# Patient Record
Sex: FEMALE | Race: WHITE | NOT HISPANIC OR LATINO | Employment: FULL TIME | URBAN - METROPOLITAN AREA
[De-identification: names, ages, dates, MRNs, and addresses within clinical notes are randomized per-mention and may not be internally consistent; named-entity substitution may affect disease eponyms.]

---

## 2018-01-15 NOTE — PROGRESS NOTES
Assessment   1  Encounter for preventive health examination (V70 0) (Z00 00)    Plan  Health Maintenance    · Start: Omeprazole 20 MG Oral Capsule Delayed Release; take 1 capsule daily    Discussion/Summary  Advice and education were given regarding aerobic exercise  Chief Complaint  MED MANAGE      History of Present Illness  HM, Adult Female: The patient is being seen for a health maintenance evaluation  General Health: The patient's health since the last visit is described as good  She has regular dental visits  Immunizations status: up to date  Lifestyle:  She consumes a diverse and healthy diet  She does not have any weight concerns  She exercises regularly  She does not use tobacco    Reproductive health:  she reports normal menses  Screening: cancer screening reviewed and current  metabolic screening reviewed and current  risk screening reviewed and current  Review of Systems    Constitutional: No fever, no chills, feels well, no tiredness, no recent weight gain or weight loss  Cardiovascular: No complaints of slow heart rate, no fast heart rate, no chest pain, no palpitations, no leg claudication, no lower extremity edema  Respiratory: No complaints of shortness of breath, no wheezing, no cough, no SOB on exertion, no orthopnea, no PND  Gastrointestinal: No complaints of abdominal pain, no constipation, no nausea or vomiting, no diarrhea, no bloody stools  Active Problems   1  Amenorrhea (626 0) (N91 2)  2  Diphtheria-tetanus-pertussis (DTP) vaccination (V06 1) (Z23)  3  Migraine with aura and without status migrainosus, not intractable (346 00) (G43 109)  4  Neck pain (723 1) (M54 2)  5  History of Oral contraceptive use (V25 41) (Z30 41)  6   Well adult on routine health check (V70 0) (Z00 00)    Past Medical History    · History of Acute mucoid otitis media of right ear (381 02) (H65 111)   · History of Bacterial vaginosis (616 10,041 9) (N76 0,B96 89)   · History of Cyst of ovary, right (620 2) (N83 201)   · History of Dysuria (788 1) (R30 0)   · History of Encounter for IUD removal (V25 12) (Z30 432)   · History of abdominal pain (V13 89) (M97 013)   · History of folliculitis (W11 1) (N19 5)   · History of migraine (V12 49) (Z86 69)   · History of Pelvic and perineal pain (625 9) (R10 2)   · History of Shoulder injury (959 2) (S49 90XA)   · History of Summary Of Previous Pregnancies  1  (Total No )   · History of Summary Of Previous Pregnancies Para 1  (Deliveries)   · History of Vaginal candidiasis (112 1) (B37 3)    Surgical History    · History of Denial Of Any Significant Medical History    Family History  Mother    · Family history of Hysteroscopy  Father    · Family history of Hypercholesteremia   · Family history of Hypertension (V17 49)  Maternal Relatives    · Family history of Ovarian ca    Social History    · Denied: History of Alcohol Use (History)   · Caffeine Use   · History of Current Every Day Smoker (305 1)   · Denied: History of Drug Use   · Former smoker (V15 82) (P09 115)   · Marital History - Single   · History of Oral contraceptive use (V25 41) (Z30 41)   · Denominational Affiliation [employer]   · Uses Safety Equipment - Seatbelts    Current Meds  1  SUMAtriptan Succinate 100 MG Oral Tablet; TAKE 1 TABLET AT ONSET OF MIGRAINE   HEADACHE  MAY REPEAT IN 2 HOURS IF NEEDED; Therapy: [id number] to (Last Rx:[id number])  Requested for: 12RPR4986 Ordered    Allergies   1  Ceftin TABS  2  Sulfa Drugs   3  Latex    Vitals   Recorded: 17RDK3771 02:65LN   Systolic 366   Diastolic 78   Heart Rate 78   O2 Saturation 98   Height 5 ft 3 in   Weight 155 lb    BMI Calculated 27 46   BSA Calculated 1 74     Physical Exam    Constitutional   General appearance: No acute distress, well appearing and well nourished  Ears, Nose, Mouth, and Throat   Nasal mucosa, septum, and turbinates: Normal without edema or erythema  Lips, teeth, and gums: Normal, good dentition  Oropharynx: Normal with no erythema, edema, exudate or lesions  Neck   Neck: Supple, symmetric, trachea midline, no masses  Thyroid: Normal, no thyromegaly  Pulmonary   Auscultation of lungs: Clear to auscultation  Cardiovascular   Auscultation of heart: Normal rate and rhythm, normal S1 and S2, no murmurs  Abdomen   Abdomen: Non-tender, no masses  Health Management  Diphtheria-tetanus-pertussis (DTP) vaccination   Tdap (Boostrix); every 10 years; Deferred until [id number]: Pt Refuses;  Temporary  Deferral    Signatures   Electronically signed by : Dav Lopez MD; Oct 13 2016  9:19AM EST                       (Author)

## 2018-03-28 ENCOUNTER — OFFICE VISIT (OUTPATIENT)
Dept: FAMILY MEDICINE CLINIC | Facility: CLINIC | Age: 28
End: 2018-03-28
Payer: COMMERCIAL

## 2018-03-28 VITALS
HEIGHT: 63 IN | SYSTOLIC BLOOD PRESSURE: 120 MMHG | HEART RATE: 84 BPM | WEIGHT: 162 LBS | OXYGEN SATURATION: 96 % | BODY MASS INDEX: 28.7 KG/M2 | RESPIRATION RATE: 16 BRPM | DIASTOLIC BLOOD PRESSURE: 70 MMHG

## 2018-03-28 DIAGNOSIS — K21.9 GASTROESOPHAGEAL REFLUX DISEASE WITHOUT ESOPHAGITIS: Primary | ICD-10-CM

## 2018-03-28 DIAGNOSIS — F41.9 ANXIETY: ICD-10-CM

## 2018-03-28 DIAGNOSIS — S29.019A THORACIC MYOFASCIAL STRAIN, INITIAL ENCOUNTER: ICD-10-CM

## 2018-03-28 PROCEDURE — 99214 OFFICE O/P EST MOD 30 MIN: CPT | Performed by: FAMILY MEDICINE

## 2018-03-28 RX ORDER — FAMOTIDINE 40 MG/1
40 TABLET, FILM COATED ORAL DAILY
Qty: 30 TABLET | Refills: 2 | Status: SHIPPED | OUTPATIENT
Start: 2018-03-28 | End: 2019-09-24 | Stop reason: SDUPTHER

## 2018-03-28 RX ORDER — SUMATRIPTAN 100 MG/1
1 TABLET, FILM COATED ORAL
COMMUNITY
Start: 2016-05-19 | End: 2019-11-14 | Stop reason: SDUPTHER

## 2018-03-28 NOTE — PROGRESS NOTES
Assessment/Plan:    No problem-specific Assessment & Plan notes found for this encounter  Diagnoses and all orders for this visit:    Gastroesophageal reflux disease without esophagitis    Thoracic myofascial strain, initial encounter    Anxiety        Patient Instructions   1) Start famotidine daily in AM at 40mg-  Use antacids after meals if having stomach upset  2) Tylenol 500 mg 2 tablets every 8 hours  For the shoulder pain as needed  Also some heat and of medium heat tend 50 minutes every 4 hours  Gentle stretching  Sleep with   Neck in the neutral position  3) as far as anxiety stay away from the caffeine  Avoid spicy foods also  4) _ nasal saline and throat lozenges for URI congestion          Subjective:   Chief Complaint   Patient presents with    Pain     epigastric pain, left leg and arm numbness          Patient ID: Steve Gottlieb is a 33 y o  female  Patient comes in today with 2 problems -  1) patient having left-sided posterior shoulder pain associated at times with some neck pain and numbness and tingling down the arms  No aggravating or alleviating activities are recognized  This tends to cause her anxiety and she gets more short of breath and heart palpitations  Presently happens 2 to 3 times a week  No injuries recently  She did have a fall 2 years ago  2 ) epigastric distress with some belching and substernal chest pain  This makes her feel short of breath  She has not taken any over-the-counter medications on a regular basis  3) several days of sinus pressure with thick discharge  And some facial pressure  The following portions of the patient's history were reviewed and updated as appropriate: allergies, current medications, past family history, past medical history, past social history and problem list     Review of Systems   Constitutional: Negative for chills, fatigue and fever  HENT: Positive for rhinorrhea and sinus pressure   Negative for sore throat  Gastrointestinal: Positive for nausea  Negative for abdominal pain, constipation, diarrhea and vomiting  Psychiatric/Behavioral: The patient is nervous/anxious  Objective:      /70 (BP Location: Right arm, Patient Position: Sitting, Cuff Size: Standard)   Pulse 84   Resp 16   Ht 5' 3" (1 6 m)   Wt 73 5 kg (162 lb)   SpO2 96%   BMI 28 70 kg/m²          Physical Exam   Constitutional: She appears well-developed and well-nourished  No distress  HENT:   Head: Normocephalic and atraumatic  Right Ear: Tympanic membrane and external ear normal  No drainage  Left Ear: Tympanic membrane normal  There is drainage  Nose: Mucosal edema and rhinorrhea present  Right sinus exhibits no maxillary sinus tenderness  Left sinus exhibits maxillary sinus tenderness  Mouth/Throat: Posterior oropharyngeal erythema present  No oropharyngeal exudate  Eyes: Conjunctivae and EOM are normal  Right eye exhibits no discharge  Left eye exhibits no discharge  Neck: Normal range of motion  Neck supple  No thyromegaly present  Cardiovascular: Normal rate, regular rhythm and normal heart sounds  Pulmonary/Chest: Effort normal  No respiratory distress  She has no wheezes  She has no rales  Abdominal: Soft  There is no hepatosplenomegaly or splenomegaly  There is tenderness in the epigastric area  There is no CVA tenderness  Lymphadenopathy:     She has no cervical adenopathy  Skin: Skin is warm and dry

## 2018-03-28 NOTE — PATIENT INSTRUCTIONS
1) Start famotidine daily in AM at 40mg-  Use antacids after meals if having stomach upset  2) Tylenol 500 mg 2 tablets every 8 hours  For the shoulder pain as needed  Also some heat and of medium heat tend 50 minutes every 4 hours  Gentle stretching  Sleep with   Neck in the neutral position  3) as far as anxiety stay away from the caffeine  Avoid spicy foods also    4) _ nasal saline and throat lozenges for URI congestion

## 2018-04-03 ENCOUNTER — TELEPHONE (OUTPATIENT)
Dept: OBGYN CLINIC | Facility: CLINIC | Age: 28
End: 2018-04-03

## 2018-04-05 ENCOUNTER — OFFICE VISIT (OUTPATIENT)
Dept: OBGYN CLINIC | Facility: CLINIC | Age: 28
End: 2018-04-05
Payer: COMMERCIAL

## 2018-04-05 VITALS
SYSTOLIC BLOOD PRESSURE: 118 MMHG | WEIGHT: 162.6 LBS | HEIGHT: 64 IN | DIASTOLIC BLOOD PRESSURE: 70 MMHG | BODY MASS INDEX: 27.76 KG/M2

## 2018-04-05 DIAGNOSIS — Z97.5 IUD CONTRACEPTION: ICD-10-CM

## 2018-04-05 DIAGNOSIS — R10.2 PELVIC CRAMPING: ICD-10-CM

## 2018-04-05 DIAGNOSIS — N91.1 SECONDARY AMENORRHEA: ICD-10-CM

## 2018-04-05 DIAGNOSIS — B96.89 BACTERIAL VAGINOSIS: Primary | ICD-10-CM

## 2018-04-05 DIAGNOSIS — N76.0 BACTERIAL VAGINOSIS: Primary | ICD-10-CM

## 2018-04-05 DIAGNOSIS — N89.8 VAGINAL ODOR: ICD-10-CM

## 2018-04-05 LAB — SL AMB POCT WET MOUNT: 5.5

## 2018-04-05 PROCEDURE — 87210 SMEAR WET MOUNT SALINE/INK: CPT | Performed by: OBSTETRICS & GYNECOLOGY

## 2018-04-05 PROCEDURE — 99203 OFFICE O/P NEW LOW 30 MIN: CPT | Performed by: OBSTETRICS & GYNECOLOGY

## 2018-04-05 RX ORDER — METRONIDAZOLE 7.5 MG/G
1 GEL VAGINAL DAILY
Qty: 70 G | Refills: 0 | Status: SHIPPED | OUTPATIENT
Start: 2018-04-05 | End: 2018-06-21 | Stop reason: SDUPTHER

## 2018-04-05 NOTE — PROGRESS NOTES
Assessment/Plan:  1  Bacterial vaginosis-patient counseled about the findings  Treatment options were reviewed  Electronic prescription for Metrogel vaginal to take nightly for 5 nights was sent a local pharmacy  She was encouraged to avoid alcohol use during this time  She will follow up if persistent symptoms noted  2   IUD contraception-patient had Mirena IUD inserted from [id number], had it removed due to concerns about side effects from the device and then [facility]  She wanted to make sure that the IUD was in good position and it is on exam today with IUD string noted at about 3 cm  She will continue with this contraceptive device  3   Secondary amenorrhea-related to Mirena IUD  4   Vaginal odor-consistent with bacterial vaginosis  Patient with her partner for greater than 2 years and declined STD testing  5   Other-patient overdue for yearly exam   She will return in the near future for this testing  6   Pelvic cramping-noted intermittently  Could be related to bacterial vaginosis  Pelvic exam is entirely unremarkable for any adnexal pain or tenderness or masses  We will defer ultrasound at this time  Of note, patient did have ultrasound last in 2014 with negative findings  She will follow-up in the near future for yearly exam   No problem-specific Assessment & Plan notes found for this encounter  Diagnoses and all orders for this visit:    Bacterial vaginosis  -     metroNIDAZOLE (METROGEL) 0 75 % vaginal gel; Insert 1 application into the vagina daily At night  -     POCT wet mount    Secondary amenorrhea    IUD contraception    Vaginal odor  -     POCT wet mount          Subjective:      Patient ID: Leroy Baum is a 33 y o  female  Patient was seen today for follow-up visit  She was last seen 2014  Please see assessment plan for details          The following portions of the patient's history were reviewed and updated as appropriate: allergies, current medications, past family history, past medical history, past social history, past surgical history and problem list     Review of Systems   Constitutional: Negative for chills, diaphoresis, fatigue and fever  Respiratory: Negative for apnea, cough, chest tightness, shortness of breath and wheezing  Cardiovascular: Negative for chest pain, palpitations and leg swelling  Gastrointestinal: Negative for abdominal distention, abdominal pain, anal bleeding, constipation, diarrhea, nausea, rectal pain and vomiting  Genitourinary: Negative for difficulty urinating, dyspareunia, dysuria, frequency, hematuria, menstrual problem, pelvic pain, urgency, vaginal bleeding, vaginal discharge and vaginal pain  Musculoskeletal: Negative for arthralgias, back pain and myalgias  Skin: Negative for color change and rash  Neurological: Negative for dizziness, syncope, light-headedness, numbness and headaches  Hematological: Negative for adenopathy  Does not bruise/bleed easily  Psychiatric/Behavioral: Negative for dysphoric mood and sleep disturbance  The patient is not nervous/anxious  vaginal odor    Objective:      /70 (BP Location: Right arm, Patient Position: Sitting, Cuff Size: Adult)   Ht 5' 4" (1 626 m)   Wt 73 8 kg (162 lb 9 6 oz)   BMI 27 91 kg/m²          Physical Exam    Objective      /70 (BP Location: Right arm, Patient Position: Sitting, Cuff Size: Adult)   Ht 5' 4" (1 626 m)   Wt 73 8 kg (162 lb 9 6 oz)   BMI 27 91 kg/m²     General:   alert and oriented, in no acute distress, alert, appears stated age and cooperative   Neck:    Breast:    Heart:    Lungs:    Abdomen: soft, non-tender, without masses or organomegaly   Vulva: normal   Vagina: Small amount of white discharge, without erythema or lesions noted  Cervix: Small amount of white discharge, without lesions or cervicitis  No Cervical motion tenderness    IUD string seen at a length of 2 5-3 cm   Uterus: top normal size, anteverted, non-tender   Adnexa: no mass, fullness, tenderness   Rectum: negative

## 2018-04-05 NOTE — PATIENT INSTRUCTIONS
Follow-up 1 month yearly examBacterial Vaginosis   WHAT YOU NEED TO KNOW:   What is bacterial vaginosis? Bacterial vaginosis (BV) is an infection in the vagina  It may cause vaginitis, which is irritation and inflammation of the vagina  What causes bacterial vaginosis? The cause of BV is not known  With BV, there is an imbalance in bacteria normally found in the vagina  Your risk for BV increases if you are sexually active  Your risk for BV also increases if you douche or have an intrauterine device (IUD)  What are the signs and symptoms of bacterial vaginosis? Some women have no symptoms  You may have the following:  · White, gray, or yellow vaginal discharge    · Vaginal discharge that smells like fish    · Itching or burning around the outside of your vagina  How is bacterial vaginosis diagnosed? Your healthcare provider will examine you and ask if you have other health conditions  He may need to take a sample of fluid from your vagina  This will be tested for the bacteria that causes BV  How is bacterial vaginosis treated? Antibiotics are given to kill the bacteria that cause BV  They may be given as a pill or as a cream to put in your vagina  Take or use as directed  What are the risks of bacterial vaginosis? If untreated, BV may spread and lead to serious infections in your uterus and fallopian tubes  This can make it more difficult to get pregnant  BV increases your risk for other sexually transmitted infections (STIs), such as chlamydia, gonorrhea, or HIV  How can I prevent bacterial vaginosis? · Keep your vaginal area clean and dry:  Wear underwear and pantyhose with a cotton crotch  Wipe from front to back after you urinate or have a bowel movement  After bathing, rinse soap from your vaginal area to decrease your risk for irritation  · Do not use products that cause irritation:  Always use unscented tampons or sanitary pads   Do not use feminine sprays, powders, or scented tampons because they may cause irritation and increase your risk of BV  Detergents and fabric softeners may also cause irritation  · Do not douche: This can cause an imbalance in healthy vaginal bacteria  · Use latex condoms: This helps prevent another infection and keeps your partner from getting the infection  When should I contact my healthcare provider? · Your symptoms come back or do not improve with treatment  · You have vaginal bleeding that is not your monthly period  · You have questions or concerns about your condition or care  CARE AGREEMENT:   You have the right to help plan your care  Learn about your health condition and how it may be treated  Discuss treatment options with your caregivers to decide what care you want to receive  You always have the right to refuse treatment  The above information is an  only  It is not intended as medical advice for individual conditions or treatments  Talk to your doctor, nurse or pharmacist before following any medical regimen to see if it is safe and effective for you  © 2017 [employer] Information is for End User's use only and may not be sold, redistributed or otherwise used for commercial purposes  All illustrations and images included in CareNotes® are the copyrighted property of A D A M , Inc  or [employer]

## 2018-06-21 DIAGNOSIS — N76.0 BACTERIAL VAGINOSIS: ICD-10-CM

## 2018-06-21 DIAGNOSIS — B96.89 BACTERIAL VAGINOSIS: ICD-10-CM

## 2018-06-21 RX ORDER — METRONIDAZOLE 7.5 MG/G
1 GEL VAGINAL DAILY
Qty: 70 G | Refills: 0 | Status: SHIPPED | OUTPATIENT
Start: 2018-06-21 | End: 2019-11-14 | Stop reason: ALTCHOICE

## 2019-07-10 ENCOUNTER — OFFICE VISIT (OUTPATIENT)
Dept: FAMILY MEDICINE CLINIC | Facility: CLINIC | Age: 29
End: 2019-07-10
Payer: COMMERCIAL

## 2019-07-10 VITALS
BODY MASS INDEX: 29.95 KG/M2 | WEIGHT: 169 LBS | HEIGHT: 63 IN | DIASTOLIC BLOOD PRESSURE: 72 MMHG | HEART RATE: 81 BPM | OXYGEN SATURATION: 98 % | SYSTOLIC BLOOD PRESSURE: 118 MMHG

## 2019-07-10 DIAGNOSIS — L02.91 ABSCESS: Primary | ICD-10-CM

## 2019-07-10 DIAGNOSIS — L72.3 SEBACEOUS CYST: ICD-10-CM

## 2019-07-10 PROCEDURE — 3008F BODY MASS INDEX DOCD: CPT | Performed by: FAMILY MEDICINE

## 2019-07-10 PROCEDURE — 3725F SCREEN DEPRESSION PERFORMED: CPT | Performed by: FAMILY MEDICINE

## 2019-07-10 PROCEDURE — 99213 OFFICE O/P EST LOW 20 MIN: CPT | Performed by: FAMILY MEDICINE

## 2019-07-10 RX ORDER — AMOXICILLIN 500 MG/1
500 CAPSULE ORAL EVERY 8 HOURS SCHEDULED
Qty: 15 CAPSULE | Refills: 0 | Status: SHIPPED | OUTPATIENT
Start: 2019-07-10 | End: 2019-07-15

## 2019-07-10 NOTE — PROGRESS NOTES
[facility]        NAME: Matt Salgado is a 33 y o  female  : 1990    MRN: [id number]  DATE: July 10, 2019  TIME: 2:10 PM    Assessment and Plan   Abscess [L02 91]  1  Abscess  amoxicillin (AMOXIL) 500 mg capsule   2  Sebaceous cyst           Patient Instructions     Patient Instructions   Moist heat--f/u 48 hrs          Chief Complaint     Chief Complaint   Patient presents with    Cyst     right side--NECK    Annual Exam     HM         History of Present Illness       inflammed cyst R neck      Review of Systems   Review of Systems   Constitutional: Negative for appetite change, chills, diaphoresis and fever  HENT: Negative for ear pain, rhinorrhea, sinus pressure and sore throat  Eyes: Negative for discharge, redness and itching  Respiratory: Negative for cough, shortness of breath and wheezing  Cardiovascular: Negative for chest pain and palpitations  Gastrointestinal: Negative for abdominal pain, diarrhea, nausea and vomiting       R neck inflammed seb cyst    Current Medications       Current Outpatient Medications:     famotidine (PEPCID) 40 MG tablet, Take 1 tablet (40 mg total) by mouth daily, Disp: 30 tablet, Rfl: 2    amoxicillin (AMOXIL) 500 mg capsule, Take 1 capsule (500 mg total) by mouth every 8 (eight) hours for 5 days, Disp: 15 capsule, Rfl: 0    metroNIDAZOLE (METROGEL) 0 75 % vaginal gel, Insert 1 application into the vagina daily At night (Patient not taking: Reported on 7/10/2019), Disp: 70 g, Rfl: 0    SUMAtriptan (IMITREX) 100 mg tablet, Take 1 tablet by mouth, Disp: , Rfl:     Current Allergies     Allergies as of 07/10/2019 - Reviewed 07/10/2019   Allergen Reaction Noted    Cefuroxime Anaphylaxis 2012    Sulfa antibiotics Anaphylaxis 2012    Latex Rash 2012            The following portions of the patient's history were reviewed and updated as appropriate: allergies, current medications, past family history, past medical history, past social history, past surgical history and problem list      No past medical history on file  Past Surgical History:   Procedure Laterality Date    NO PAST SURGERIES         Family History   Problem Relation Age of Onset    Hypertension Father     Hyperlipidemia Father     No Known Problems Mother     Substance Abuse Neg Hx     Mental illness Neg Hx          Medications have been verified          Objective   /72   Pulse 81   Ht 5' 3" (1 6 m)   Wt 76 7 kg (169 lb)   SpO2 98%   BMI 29 94 kg/m²        Physical Exam     Physical Exam

## 2019-07-17 ENCOUNTER — PROCEDURE VISIT (OUTPATIENT)
Dept: FAMILY MEDICINE CLINIC | Facility: CLINIC | Age: 29
End: 2019-07-17

## 2019-07-17 VITALS
SYSTOLIC BLOOD PRESSURE: 122 MMHG | HEART RATE: 75 BPM | BODY MASS INDEX: 29.95 KG/M2 | WEIGHT: 169 LBS | HEIGHT: 63 IN | OXYGEN SATURATION: 98 % | DIASTOLIC BLOOD PRESSURE: 76 MMHG

## 2019-07-17 DIAGNOSIS — L72.3 SEBACEOUS CYST: Primary | ICD-10-CM

## 2019-07-17 NOTE — PROGRESS NOTES
Incision and Drainage  Date/Time: 7/17/2019 1:39 PM  Performed by: Julien Victor MD  Authorized by: Julien Victor MD     Patient location:  Clinic  Other Assisting Provider: No    Consent:     Consent obtained:  Verbal    Consent given by:  Patient    Risks discussed:  Pain    Alternatives discussed:  No treatment  Universal protocol:     Procedure explained and questions answered to patient or proxy's satisfaction: yes      Relevant documents present and verified: yes      Test results available and properly labeled: yes      Site/side marked: yes      Immediately prior to procedure a time out was called: no      Patient identity confirmed:  Verbally with patient  Location:     Type:  Cyst    Location:  Head/neck    Head/neck location:  Neck  Pre-procedure details:     Skin preparation:  Antiseptic wash  Anesthesia (see MAR for exact dosages): Anesthesia method:  Local infiltration    Local anesthetic:  Lidocaine 1% WITH epi  Procedure details:     Complexity:  Complex    Needle aspiration: no      Incision types:  Stab incision    Scalpel blade:  11    Approach:  Open    Incision depth:  Subcutaneous    Wound management:  Probed and deloculated and debrided    Drainage:  Purulent    Drainage amount: Moderate    Wound treatment:  Packing placed and wound left open    Packing materials:  1/4 in gauze    Amount 1/4":  2 inches  Post-procedure details:     Patient tolerance of procedure:   Tolerated well, no immediate complications

## 2019-07-18 ENCOUNTER — TELEPHONE (OUTPATIENT)
Dept: FAMILY MEDICINE CLINIC | Facility: CLINIC | Age: 29
End: 2019-07-18

## 2019-09-24 DIAGNOSIS — K21.9 GASTROESOPHAGEAL REFLUX DISEASE WITHOUT ESOPHAGITIS: ICD-10-CM

## 2019-09-24 RX ORDER — FAMOTIDINE 40 MG/1
40 TABLET, FILM COATED ORAL DAILY
Qty: 90 TABLET | Refills: 3 | Status: SHIPPED | OUTPATIENT
Start: 2019-09-24 | End: 2020-06-01 | Stop reason: SDUPTHER

## 2019-11-14 ENCOUNTER — OFFICE VISIT (OUTPATIENT)
Dept: FAMILY MEDICINE CLINIC | Facility: CLINIC | Age: 29
End: 2019-11-14
Payer: COMMERCIAL

## 2019-11-14 VITALS
OXYGEN SATURATION: 98 % | HEIGHT: 63 IN | WEIGHT: 172 LBS | DIASTOLIC BLOOD PRESSURE: 80 MMHG | SYSTOLIC BLOOD PRESSURE: 120 MMHG | BODY MASS INDEX: 30.48 KG/M2 | HEART RATE: 74 BPM

## 2019-11-14 DIAGNOSIS — G43.909 MIGRAINE WITHOUT STATUS MIGRAINOSUS, NOT INTRACTABLE, UNSPECIFIED MIGRAINE TYPE: ICD-10-CM

## 2019-11-14 DIAGNOSIS — J01.00 ACUTE NON-RECURRENT MAXILLARY SINUSITIS: Primary | ICD-10-CM

## 2019-11-14 PROCEDURE — 3725F SCREEN DEPRESSION PERFORMED: CPT | Performed by: FAMILY MEDICINE

## 2019-11-14 PROCEDURE — 99214 OFFICE O/P EST MOD 30 MIN: CPT | Performed by: FAMILY MEDICINE

## 2019-11-14 RX ORDER — AMOXICILLIN 500 MG/1
500 CAPSULE ORAL EVERY 8 HOURS SCHEDULED
Qty: 30 CAPSULE | Refills: 0 | Status: SHIPPED | OUTPATIENT
Start: 2019-11-14 | End: 2019-11-24

## 2019-11-14 RX ORDER — SUMATRIPTAN 100 MG/1
100 TABLET, FILM COATED ORAL ONCE AS NEEDED
Qty: 12 TABLET | Refills: 5 | Status: SHIPPED | OUTPATIENT
Start: 2019-11-14 | End: 2020-09-23 | Stop reason: SDUPTHER

## 2019-11-14 NOTE — PROGRESS NOTES
[facility]        NAME: Lacey Burks is a 30 y o  female  : 1990    MRN: [id number]  DATE: 2019  TIME: 9:17 AM    Assessment and Plan   Acute non-recurrent maxillary sinusitis [J01 00]  1  Acute non-recurrent maxillary sinusitis           Patient Instructions     Patient Instructions   See meds          Chief Complaint     Chief Complaint   Patient presents with    Lump in neck     for 1 5 week     Earache     for 3 weeks     Sore Throat     for 3 weeks     Cough     for 3 weeks          History of Present Illness       C/o R throat pain/sinus--mod severe--nor resp OTC--1 wk      Review of Systems   Review of Systems   Constitutional: Negative for fatigue, fever and unexpected weight change  HENT: Positive for congestion, postnasal drip, sinus pressure, sinus pain, sore throat and trouble swallowing  Eyes: Negative for visual disturbance  Respiratory: Negative for shortness of breath and wheezing  Cardiovascular: Negative for chest pain and palpitations  Gastrointestinal: Negative for abdominal pain, nausea and vomiting  Musculoskeletal: Negative  Negative for arthralgias and myalgias  Neurological: Negative for syncope, weakness and numbness  Psychiatric/Behavioral: Negative  Negative for confusion, dysphoric mood and suicidal ideas           Current Medications       Current Outpatient Medications:     famotidine (PEPCID) 40 MG tablet, Take 1 tablet (40 mg total) by mouth daily, Disp: 90 tablet, Rfl: 3    SUMAtriptan (IMITREX) 100 mg tablet, Take 1 tablet by mouth, Disp: , Rfl:     Current Allergies     Allergies as of 2019 - Reviewed 2019   Allergen Reaction Noted    Cefuroxime Anaphylaxis 2012    Sulfa antibiotics Anaphylaxis 2012    Latex Rash 2012            The following portions of the patient's history were reviewed and updated as appropriate: allergies, current medications, past family history, past medical history, past social history, past surgical history and problem list      History reviewed  No pertinent past medical history  Past Surgical History:   Procedure Laterality Date    NO PAST SURGERIES         Family History   Problem Relation Age of Onset    Hypertension Father     Hyperlipidemia Father     No Known Problems Mother     Substance Abuse Neg Hx     Mental illness Neg Hx          Medications have been verified  Objective   /80   Pulse 74   Ht 5' 3" (1 6 m)   Wt 78 kg (172 lb)   SpO2 98%   BMI 30 47 kg/m²        Physical Exam     Physical Exam   Constitutional: She is oriented to person, place, and time  She appears well-developed and well-nourished  No distress  HENT:   Head: Normocephalic and atraumatic  Right Ear: Tympanic membrane, external ear and ear canal normal    Left Ear: Tympanic membrane, external ear and ear canal normal    Nose: Rhinorrhea present  No epistaxis  Right sinus exhibits maxillary sinus tenderness  Left sinus exhibits maxillary sinus tenderness  Mouth/Throat: Uvula is midline, oropharynx is clear and moist and mucous membranes are normal    Cardiovascular: Normal rate, regular rhythm and normal heart sounds  Exam reveals no gallop and no friction rub  No murmur heard  Pulmonary/Chest: Effort normal and breath sounds normal  No respiratory distress  She has no wheezes  She has no rales  Abdominal: She exhibits no distension  Musculoskeletal: Normal range of motion  Neurological: She is alert and oriented to person, place, and time  Skin: She is not diaphoretic  Psychiatric: She has a normal mood and affect  Her behavior is normal  Judgment and thought content normal    Nursing note and vitals reviewed  BMI Counseling: Body mass index is 30 47 kg/m²  The BMI is above normal  Nutrition recommendations include reducing portion sizes

## 2020-06-01 ENCOUNTER — OFFICE VISIT (OUTPATIENT)
Dept: FAMILY MEDICINE CLINIC | Facility: CLINIC | Age: 30
End: 2020-06-01
Payer: COMMERCIAL

## 2020-06-01 VITALS
BODY MASS INDEX: 29.66 KG/M2 | HEIGHT: 63 IN | WEIGHT: 167.4 LBS | OXYGEN SATURATION: 98 % | TEMPERATURE: 97.9 F | DIASTOLIC BLOOD PRESSURE: 70 MMHG | SYSTOLIC BLOOD PRESSURE: 118 MMHG | HEART RATE: 79 BPM

## 2020-06-01 DIAGNOSIS — Z00.00 WELLNESS EXAMINATION: ICD-10-CM

## 2020-06-01 DIAGNOSIS — S16.1XXA STRAIN OF NECK MUSCLE, INITIAL ENCOUNTER: Primary | ICD-10-CM

## 2020-06-01 DIAGNOSIS — K21.9 GASTROESOPHAGEAL REFLUX DISEASE WITHOUT ESOPHAGITIS: ICD-10-CM

## 2020-06-01 DIAGNOSIS — R22.1 NECK MASS: ICD-10-CM

## 2020-06-01 PROCEDURE — 99395 PREV VISIT EST AGE 18-39: CPT | Performed by: FAMILY MEDICINE

## 2020-06-01 PROCEDURE — 3008F BODY MASS INDEX DOCD: CPT | Performed by: FAMILY MEDICINE

## 2020-06-01 PROCEDURE — 99214 OFFICE O/P EST MOD 30 MIN: CPT | Performed by: FAMILY MEDICINE

## 2020-06-01 RX ORDER — FAMOTIDINE 40 MG/1
40 TABLET, FILM COATED ORAL DAILY
Qty: 90 TABLET | Refills: 3 | Status: SHIPPED | OUTPATIENT
Start: 2020-06-01 | End: 2020-09-23 | Stop reason: SDUPTHER

## 2020-06-01 RX ORDER — PREDNISONE 20 MG/1
TABLET ORAL
Qty: 15 TABLET | Refills: 0 | Status: SHIPPED | OUTPATIENT
Start: 2020-06-01 | End: 2021-03-01

## 2020-06-13 ENCOUNTER — HOSPITAL ENCOUNTER (OUTPATIENT)
Dept: ULTRASOUND IMAGING | Facility: HOSPITAL | Age: 30
Discharge: HOME | End: 2020-06-13
Payer: COMMERCIAL

## 2020-06-13 DIAGNOSIS — R22.1 NECK MASS: ICD-10-CM

## 2020-06-13 PROCEDURE — 76536 US EXAM OF HEAD AND NECK: CPT

## 2020-06-16 ENCOUNTER — TELEPHONE (OUTPATIENT)
Dept: FAMILY MEDICINE CLINIC | Facility: CLINIC | Age: 30
End: 2020-06-16

## 2020-07-16 ENCOUNTER — TELEPHONE (OUTPATIENT)
Dept: OTHER | Facility: OTHER | Age: 30
End: 2020-07-16

## 2020-07-16 ENCOUNTER — TELEPHONE (OUTPATIENT)
Dept: FAMILY MEDICINE CLINIC | Facility: CLINIC | Age: 30
End: 2020-07-16

## 2020-07-16 DIAGNOSIS — J03.90 TONSILLITIS: Primary | ICD-10-CM

## 2020-07-16 RX ORDER — AMOXICILLIN 500 MG/1
500 CAPSULE ORAL EVERY 8 HOURS SCHEDULED
Qty: 30 CAPSULE | Refills: 0 | Status: SHIPPED | OUTPATIENT
Start: 2020-07-16 | End: 2020-07-26

## 2020-07-16 NOTE — TELEPHONE ENCOUNTER
Pt states she lives 1 hr to 1/2 away and has developed swollen tonsils overnight  Pt would like to know speak to someone in the office to see if she can get Amoxicillin

## 2020-07-16 NOTE — TELEPHONE ENCOUNTER
Pt call c/o sorethroat with white spot on her left tonsil looking for antibiotin per pt  Amoxicillin preferred [facility] [address] Thank you

## 2020-09-10 ENCOUNTER — TELEMEDICINE (OUTPATIENT)
Dept: FAMILY MEDICINE CLINIC | Facility: CLINIC | Age: 30
End: 2020-09-10
Payer: COMMERCIAL

## 2020-09-10 DIAGNOSIS — J03.90 TONSILLITIS: Primary | ICD-10-CM

## 2020-09-10 PROCEDURE — 99213 OFFICE O/P EST LOW 20 MIN: CPT | Performed by: FAMILY MEDICINE

## 2020-09-10 RX ORDER — AMOXICILLIN 500 MG/1
500 CAPSULE ORAL EVERY 8 HOURS SCHEDULED
Qty: 30 CAPSULE | Refills: 0 | Status: SHIPPED | OUTPATIENT
Start: 2020-09-10 | End: 2020-09-20

## 2020-09-10 NOTE — PROGRESS NOTES
Virtual Regular Visit      Assessment/Plan:    Problem List Items Addressed This Visit     None      Visit Diagnoses     Tonsillitis    -  Primary    Relevant Medications    amoxicillin (AMOXIL) 500 mg capsule               Reason for visit is No chief complaint on file  Encounter provider Asiya Galeana MD    Provider located at [address] [place] [id number]      Recent Visits  No visits were found meeting these conditions  Showing recent visits within past 7 days and meeting all other requirements     Today's Visits  Date Type Provider Dept   09/10/20 Telemedicine Asiya Galeana MD [facility] today's visits and meeting all other requirements     Future Appointments  No visits were found meeting these conditions  Showing future appointments within next 150 days and meeting all other requirements        The patient was identified by name and date of birth  Gomez Romulo was informed that this is a telemedicine visit and that the visit is being conducted through [email or web address] and patient was informed that this is not a secure, HIPAA-complaint platform  She agrees to proceed     My office door was closed  No one else was in the room  She acknowledged consent and understanding of privacy and security of the video platform  The patient has agreed to participate and understands they can discontinue the visit at any time  Patient is aware this is a billable service  nAgelique Pena is a 35 y o  female c/o tonsillitis--good resp to amox in past        HPI     No past medical history on file      Past Surgical History:   Procedure Laterality Date    NO PAST SURGERIES         Current Outpatient Medications   Medication Sig Dispense Refill    amoxicillin (AMOXIL) 500 mg capsule Take 1 capsule (500 mg total) by mouth every 8 (eight) hours for 10 days 30 capsule 0    diclofenac sodium (VOLTAREN) 1 % Apply 2 g topically 4 (four) times a day 100 g 10    famotidine (PEPCID) 40 MG tablet Take 1 tablet (40 mg total) by mouth daily 90 tablet 3    predniSONE 20 mg tablet TAKE 1 TABLET BID X 5 DAYS THEN TAKE 1 TABLET QD FOR 5 DAYS 15 tablet 0    SUMAtriptan (IMITREX) 100 mg tablet Take 1 tablet (100 mg total) by mouth once as needed for migraine for up to 1 dose 12 tablet 5     No current facility-administered medications for this visit  Allergies   Allergen Reactions    Cefuroxime Anaphylaxis    Sulfa Antibiotics Anaphylaxis    Latex Rash       Review of Systems   Constitutional: Positive for fever  Negative for appetite change, chills and diaphoresis  HENT: Positive for sore throat and trouble swallowing  Negative for ear pain, rhinorrhea and sinus pressure  Eyes: Negative for discharge, redness and itching  Respiratory: Negative for cough, shortness of breath and wheezing  Cardiovascular: Negative for chest pain and palpitations  Gastrointestinal: Negative for abdominal pain, diarrhea, nausea and vomiting  Video Exam    There were no vitals filed for this visit  Physical Exam  Pulmonary:      Effort: Pulmonary effort is normal    Neurological:      Mental Status: She is oriented to person, place, and time  I spent 15 minutes directly with the patient during this visit      [address] acknowledges that she has consented to an online visit or consultation  She understands that the online visit is based solely on information provided by her, and that, in the absence of a face-to-face physical evaluation by the physician, the diagnosis she receives is both limited and provisional in terms of accuracy and completeness  This is not intended to replace a full medical face-to-face evaluation by the physician  Hernandez Welch understands and accepts these terms

## 2020-09-23 DIAGNOSIS — K21.9 GASTROESOPHAGEAL REFLUX DISEASE WITHOUT ESOPHAGITIS: ICD-10-CM

## 2020-09-23 DIAGNOSIS — G43.909 MIGRAINE WITHOUT STATUS MIGRAINOSUS, NOT INTRACTABLE, UNSPECIFIED MIGRAINE TYPE: ICD-10-CM

## 2020-09-24 RX ORDER — FAMOTIDINE 40 MG/1
40 TABLET, FILM COATED ORAL DAILY
Qty: 90 TABLET | Refills: 0 | Status: SHIPPED | OUTPATIENT
Start: 2020-09-24 | End: 2021-03-01 | Stop reason: SDUPTHER

## 2020-09-24 RX ORDER — SUMATRIPTAN 100 MG/1
100 TABLET, FILM COATED ORAL ONCE AS NEEDED
Qty: 12 TABLET | Refills: 3 | Status: SHIPPED | OUTPATIENT
Start: 2020-09-24

## 2021-01-11 ENCOUNTER — TELEPHONE (OUTPATIENT)
Dept: OBGYN CLINIC | Facility: CLINIC | Age: 31
End: 2021-01-11

## 2021-01-11 NOTE — TELEPHONE ENCOUNTER
----- Message from Lucio Morel sent at 1/8/2021  3:03 PM EST -----  Regarding: RE: Non-Urgent Medical Question  Contact: [phone]  Thank you!   My phone number is [phone]

## 2021-03-01 ENCOUNTER — PROCEDURE VISIT (OUTPATIENT)
Dept: OBGYN CLINIC | Facility: CLINIC | Age: 31
End: 2021-03-01
Payer: COMMERCIAL

## 2021-03-01 ENCOUNTER — OFFICE VISIT (OUTPATIENT)
Dept: FAMILY MEDICINE CLINIC | Facility: CLINIC | Age: 31
End: 2021-03-01
Payer: COMMERCIAL

## 2021-03-01 VITALS — WEIGHT: 173 LBS | BODY MASS INDEX: 30.65 KG/M2 | DIASTOLIC BLOOD PRESSURE: 90 MMHG | SYSTOLIC BLOOD PRESSURE: 138 MMHG

## 2021-03-01 VITALS
HEART RATE: 82 BPM | TEMPERATURE: 97.5 F | BODY MASS INDEX: 30.83 KG/M2 | WEIGHT: 174 LBS | HEIGHT: 63 IN | OXYGEN SATURATION: 97 % | SYSTOLIC BLOOD PRESSURE: 112 MMHG | DIASTOLIC BLOOD PRESSURE: 70 MMHG

## 2021-03-01 DIAGNOSIS — K21.9 GASTROESOPHAGEAL REFLUX DISEASE WITHOUT ESOPHAGITIS: ICD-10-CM

## 2021-03-01 DIAGNOSIS — L72.3 SEBACEOUS CYST: Primary | ICD-10-CM

## 2021-03-01 DIAGNOSIS — Z30.433 ENCOUNTER FOR REMOVAL AND REINSERTION OF INTRAUTERINE CONTRACEPTIVE DEVICE (IUD): Primary | ICD-10-CM

## 2021-03-01 LAB — SL AMB POCT URINE HCG: NORMAL

## 2021-03-01 PROCEDURE — 81025 URINE PREGNANCY TEST: CPT | Performed by: OBSTETRICS & GYNECOLOGY

## 2021-03-01 PROCEDURE — 58301 REMOVE INTRAUTERINE DEVICE: CPT | Performed by: OBSTETRICS & GYNECOLOGY

## 2021-03-01 PROCEDURE — 3008F BODY MASS INDEX DOCD: CPT | Performed by: FAMILY MEDICINE

## 2021-03-01 PROCEDURE — 58300 INSERT INTRAUTERINE DEVICE: CPT | Performed by: OBSTETRICS & GYNECOLOGY

## 2021-03-01 PROCEDURE — 99212 OFFICE O/P EST SF 10 MIN: CPT | Performed by: FAMILY MEDICINE

## 2021-03-01 PROCEDURE — 3725F SCREEN DEPRESSION PERFORMED: CPT | Performed by: FAMILY MEDICINE

## 2021-03-01 PROCEDURE — 11421 EXC H-F-NK-SP B9+MARG 0.6-1: CPT | Performed by: FAMILY MEDICINE

## 2021-03-01 RX ORDER — FAMOTIDINE 20 MG/1
20 TABLET, FILM COATED ORAL DAILY
Qty: 90 TABLET | Refills: 3 | Status: SHIPPED | OUTPATIENT
Start: 2021-03-01 | End: 2021-10-11 | Stop reason: SDUPTHER

## 2021-03-01 RX ORDER — FAMOTIDINE 20 MG/1
20 TABLET, FILM COATED ORAL DAILY
Qty: 90 TABLET | Refills: 3 | Status: SHIPPED | OUTPATIENT
Start: 2021-03-01 | End: 2021-03-01 | Stop reason: SDUPTHER

## 2021-03-01 NOTE — PROGRESS NOTES
Skin excision    Date/Time: 3/1/2021 11:55 AM  Performed by: Cash Brock MD  Authorized by: Cash Brock MD     Procedure Details - Skin Excision:     Number of Lesions:  1  Lesion 1:     Body area:  Head/neck    Head/neck location:  Neck       Final defect size (mm):  8    Malignancy: benign lesion      Closure complexity: simple      Repair size (cm):  0 8     Repair Comments: seb cyst R neck --removed in entirety--2 sutures 5/0 nylon

## 2021-03-01 NOTE — PROGRESS NOTES
Iud removal    Date/Time: 3/1/2021 8:53 AM  Performed by: Grady Masterson MD  Authorized by: Grady Masterson MD   Universal Protocol:  Consent: Written consent obtained  Risks and benefits: risks, benefits and alternatives were discussed  Consent given by: patient  Time out: Immediately prior to procedure a "time out" was called to verify the correct patient, procedure, equipment, support staff and site/side marked as required  Patient understanding: patient states understanding of the procedure being performed  Patient consent: the patient's understanding of the procedure matches consent given  Procedure consent: procedure consent matches procedure scheduled  Relevant documents: relevant documents present and verified  Patient identity confirmed: verbally with patient      Procedure:     Removed with no complications: yes      Removal due to mechanical complications of IUD: no      Removal due to infection and inflammatory reaction: no      Other reason for removal:      Iud insertions    Date/Time: 3/1/2021 8:54 AM  Performed by: Grady Masterson MD  Authorized by: Grady Masterson MD   Universal Protocol:  Consent: Written consent obtained  Risks and benefits: risks, benefits and alternatives were discussed  Consent given by: patient  Time out: Immediately prior to procedure a "time out" was called to verify the correct patient, procedure, equipment, support staff and site/side marked as required    Patient understanding: patient states understanding of the procedure being performed  Patient consent: the patient's understanding of the procedure matches consent given  Procedure consent: procedure consent matches procedure scheduled  Relevant documents: relevant documents present and verified  Test results: test results available and properly labeled  Required items: required blood products, implants, devices, and special equipment available  Patient identity confirmed: verbally with patient        Procedure: Pelvic exam performed: yes      Negative urine pregnancy test: yes      Cervix cleaned and prepped: yes      Speculum placed in vagina: yes      Tenaculum applied to cervix: yes      Uterus sounded: yes      Uterus sound depth (cm):  8    IUD inserted with no complications: yes      IUD type:  Mirena    Strings trimmed: yes    Post-procedure:     Patient tolerated procedure well: yes      Patient will follow up after next period: no

## 2021-03-01 NOTE — PROGRESS NOTES
[facility]        NAME: Lacey Burks is a 35 y o  female  : 1990    MRN: [id number]  DATE: 2021  TIME: 12:01 PM    Assessment and Plan   Sebaceous cyst [L72 3]  1  Sebaceous cyst  Skin excision   2  Gastroesophageal reflux disease without esophagitis  famotidine (PEPCID) 20 mg tablet    DISCONTINUED: famotidine (PEPCID) 20 mg tablet         Patient Instructions     Patient Instructions   suture removal 7-9 days          Chief Complaint     Chief Complaint   Patient presents with    Cyst     right-side of neck         History of Present Illness       C/o cyst R side neck      Review of Systems   Review of Systems   Respiratory: Negative  Cardiovascular: Negative  Skin:        neck         Current Medications       Current Outpatient Medications:     famotidine (PEPCID) 20 mg tablet, Take 1 tablet (20 mg total) by mouth daily, Disp: 90 tablet, Rfl: 3    SUMAtriptan (IMITREX) 100 mg tablet, Take 1 tablet (100 mg total) by mouth once as needed for migraine for up to 1 dose, Disp: 12 tablet, Rfl: 3  No current facility-administered medications for this visit  Current Allergies     Allergies as of 2021 - Reviewed 2021   Allergen Reaction Noted    Cefuroxime Anaphylaxis 2012    Sulfa antibiotics Anaphylaxis 2012    Latex Rash 2012            The following portions of the patient's history were reviewed and updated as appropriate: allergies, current medications, past family history, past medical history, past social history, past surgical history and problem list      No past medical history on file  Past Surgical History:   Procedure Laterality Date    NO PAST SURGERIES         Family History   Problem Relation Age of Onset    Hypertension Father     Hyperlipidemia Father     No Known Problems Mother     Substance Abuse Neg Hx     Mental illness Neg Hx          Medications have been verified          Objective   /70 Pulse 82   Temp 97 5 °F (36 4 °C) (Temporal)   Ht 5' 3" (1 6 m)   Wt 78 9 kg (174 lb)   SpO2 97%   BMI 30 82 kg/m²        Physical Exam     Physical Exam  Cardiovascular:      Heart sounds: Normal heart sounds  Pulmonary:      Breath sounds: Normal breath sounds     Skin:     Comments: Recurrent seb cyst R neck

## 2021-10-11 DIAGNOSIS — K21.9 GASTROESOPHAGEAL REFLUX DISEASE WITHOUT ESOPHAGITIS: ICD-10-CM

## 2021-10-11 RX ORDER — FAMOTIDINE 20 MG/1
20 TABLET, FILM COATED ORAL DAILY
Qty: 90 TABLET | Refills: 0 | Status: SHIPPED | OUTPATIENT
Start: 2021-10-11 | End: 2021-10-11 | Stop reason: SDUPTHER

## 2021-10-11 RX ORDER — FAMOTIDINE 20 MG/1
20 TABLET, FILM COATED ORAL DAILY
Qty: 90 TABLET | Refills: 0 | Status: SHIPPED | OUTPATIENT
Start: 2021-10-11 | End: 2021-12-27 | Stop reason: SDUPTHER

## 2021-10-18 ENCOUNTER — TELEMEDICINE (OUTPATIENT)
Dept: FAMILY MEDICINE CLINIC | Facility: CLINIC | Age: 31
End: 2021-10-18
Payer: COMMERCIAL

## 2021-10-18 DIAGNOSIS — J32.1 SINUSITIS CHRONIC, FRONTAL: ICD-10-CM

## 2021-10-18 DIAGNOSIS — Z20.822 SUSPECTED COVID-19 VIRUS INFECTION: Primary | ICD-10-CM

## 2021-10-18 PROCEDURE — 99213 OFFICE O/P EST LOW 20 MIN: CPT | Performed by: FAMILY MEDICINE

## 2021-10-18 RX ORDER — AZITHROMYCIN 250 MG/1
TABLET, FILM COATED ORAL
Qty: 6 TABLET | Refills: 0 | Status: SHIPPED | OUTPATIENT
Start: 2021-10-18 | End: 2022-01-14

## 2021-12-27 DIAGNOSIS — K21.9 GASTROESOPHAGEAL REFLUX DISEASE WITHOUT ESOPHAGITIS: ICD-10-CM

## 2021-12-28 RX ORDER — FAMOTIDINE 20 MG/1
20 TABLET, FILM COATED ORAL DAILY
Qty: 90 TABLET | Refills: 0 | Status: SHIPPED | OUTPATIENT
Start: 2021-12-28 | End: 2022-01-12 | Stop reason: SDUPTHER

## 2022-01-12 DIAGNOSIS — K21.9 GASTROESOPHAGEAL REFLUX DISEASE WITHOUT ESOPHAGITIS: ICD-10-CM

## 2022-01-12 RX ORDER — FAMOTIDINE 20 MG/1
20 TABLET, FILM COATED ORAL DAILY
Qty: 90 TABLET | Refills: 0 | Status: SHIPPED | OUTPATIENT
Start: 2022-01-12 | End: 2022-04-03

## 2022-01-14 DIAGNOSIS — J32.1 SINUSITIS CHRONIC, FRONTAL: ICD-10-CM

## 2022-01-14 RX ORDER — AZITHROMYCIN 250 MG/1
TABLET, FILM COATED ORAL
Qty: 6 TABLET | Refills: 0 | Status: SHIPPED | OUTPATIENT
Start: 2022-01-14 | End: 2022-05-30

## 2022-04-03 DIAGNOSIS — K21.9 GASTROESOPHAGEAL REFLUX DISEASE WITHOUT ESOPHAGITIS: ICD-10-CM

## 2022-04-03 RX ORDER — FAMOTIDINE 20 MG/1
TABLET, FILM COATED ORAL
Qty: 90 TABLET | Refills: 0 | Status: SHIPPED | OUTPATIENT
Start: 2022-04-03 | End: 2022-07-07

## 2022-04-19 ENCOUNTER — NURSE TRIAGE (OUTPATIENT)
Dept: OTHER | Facility: OTHER | Age: 32
End: 2022-04-19

## 2022-04-19 DIAGNOSIS — N30.00 ACUTE CYSTITIS WITHOUT HEMATURIA: Primary | ICD-10-CM

## 2022-04-19 RX ORDER — NITROFURANTOIN 25; 75 MG/1; MG/1
100 CAPSULE ORAL 2 TIMES DAILY
Qty: 10 CAPSULE | Refills: 2 | Status: SHIPPED | OUTPATIENT
Start: 2022-04-19 | End: 2022-04-24

## 2022-04-19 NOTE — TELEPHONE ENCOUNTER
Regarding: UTI symptoms  ----- Message from Precious Benjamin sent at 4/19/2022  8:07 AM EDT -----  "I think I have a UTI and would like antibiotics  "

## 2022-04-19 NOTE — TELEPHONE ENCOUNTER
Patient with UTI symptoms and wanting antibiotic  At time of triage patient was able to speak to someone in office

## 2022-05-29 DIAGNOSIS — J32.1 SINUSITIS CHRONIC, FRONTAL: ICD-10-CM

## 2022-05-30 RX ORDER — AZITHROMYCIN 250 MG/1
TABLET, FILM COATED ORAL
Qty: 6 TABLET | Refills: 0 | Status: SHIPPED | OUTPATIENT
Start: 2022-05-30 | End: 2022-06-03

## 2022-07-07 DIAGNOSIS — K21.9 GASTROESOPHAGEAL REFLUX DISEASE WITHOUT ESOPHAGITIS: ICD-10-CM

## 2022-07-07 RX ORDER — FAMOTIDINE 20 MG/1
TABLET, FILM COATED ORAL
Qty: 90 TABLET | Refills: 0 | Status: SHIPPED | OUTPATIENT
Start: 2022-07-07

## 2022-08-27 DIAGNOSIS — J32.1 SINUSITIS CHRONIC, FRONTAL: ICD-10-CM

## 2022-08-29 RX ORDER — AZITHROMYCIN 250 MG/1
TABLET, FILM COATED ORAL
Qty: 6 TABLET | Refills: 0 | Status: SHIPPED | OUTPATIENT
Start: 2022-08-29 | End: 2022-09-02

## 2022-09-26 ENCOUNTER — TELEMEDICINE (OUTPATIENT)
Dept: FAMILY MEDICINE CLINIC | Facility: CLINIC | Age: 32
End: 2022-09-26
Payer: COMMERCIAL

## 2022-09-26 DIAGNOSIS — J01.00 ACUTE NON-RECURRENT MAXILLARY SINUSITIS: Primary | ICD-10-CM

## 2022-09-26 PROCEDURE — 99213 OFFICE O/P EST LOW 20 MIN: CPT | Performed by: NURSE PRACTITIONER

## 2022-09-26 RX ORDER — AZITHROMYCIN 250 MG/1
TABLET, FILM COATED ORAL
Qty: 6 TABLET | Refills: 0 | Status: SHIPPED | OUTPATIENT
Start: 2022-09-26 | End: 2022-10-01

## 2022-09-26 NOTE — PROGRESS NOTES
Virtual Regular Visit    Verification of patient location:    Patient is located in the following state in which I hold an active license [place]      Assessment/Plan:    Problem List Items Addressed This Visit    None     Visit Diagnoses     Acute non-recurrent maxillary sinusitis    -  Primary    Relevant Medications    azithromycin (Zithromax) 250 mg tablet      Discussed viral vs bacterial infection  Zithromax as ordered  Continue OTC cough/cold medications as directed  Call or return for problems/concerns         Reason for visit is   Chief Complaint   Patient presents with    Virtual Regular Visit        Encounter provider PEDRO Tucker    Provider located at [address] [place] [address]      Recent Visits  No visits were found meeting these conditions  Showing recent visits within past 7 days and meeting all other requirements  Today's Visits  Date Type Provider Dept   09/26/22 Telemedicine PEDRO Banegas [facility]   Showing today's visits and meeting all other requirements  Future Appointments  No visits were found meeting these conditions  Showing future appointments within next 150 days and meeting all other requirements       The patient was identified by name and date of birth  Wilberto Mcdaniel was informed that this is a telemedicine visit and that the visit is being conducted through [address] [place] [address] Now and patient was informed that this is a secure, HIPAA-compliant platform  She agrees to proceed     My office door was closed  No one else was in the room  She acknowledged consent and understanding of privacy and security of the video platform  The patient has agreed to participate and understands they can discontinue the visit at any time  Patient is aware this is a billable service  Odin Arevalo is a 33 y o  female    C/o sinus pressure, productive cough for over a week  Negative Covid tests  Taking OTC cold medications  No fever  No shortness of breath  History reviewed  No pertinent past medical history  Past Surgical History:   Procedure Laterality Date    NO PAST SURGERIES         Current Outpatient Medications   Medication Sig Dispense Refill    azithromycin (Zithromax) 250 mg tablet Take 2 tablets (500 mg total) by mouth daily for 1 day, THEN 1 tablet (250 mg total) daily for 4 days  6 tablet 0    famotidine (PEPCID) 20 mg tablet TAKE 1 TABLET BY MOUTH EVERY DAY 90 tablet 0    SUMAtriptan (IMITREX) 100 mg tablet Take 1 tablet (100 mg total) by mouth once as needed for migraine for up to 1 dose 12 tablet 3     No current facility-administered medications for this visit  Allergies   Allergen Reactions    Cefuroxime Anaphylaxis    Sulfa Antibiotics Anaphylaxis    Latex Rash       Review of Systems   Constitutional: Negative for activity change, chills, fatigue and fever  HENT: Positive for congestion, postnasal drip and sinus pressure  Negative for ear pain, rhinorrhea and sore throat  Eyes: Negative for pain, discharge and redness  Respiratory: Positive for cough  Negative for shortness of breath and wheezing  Cardiovascular: Negative for chest pain  Gastrointestinal: Negative for constipation, diarrhea, nausea and vomiting  Musculoskeletal: Negative for myalgias  Skin: Negative for rash  Neurological: Positive for headaches  Negative for dizziness  Video Exam    There were no vitals filed for this visit  Physical Exam  Constitutional:       General: She is not in acute distress  Appearance: Normal appearance  HENT:      Head: Normocephalic  Eyes:      Extraocular Movements: Extraocular movements intact  Conjunctiva/sclera: Conjunctivae normal    Pulmonary:      Effort: Pulmonary effort is normal  No respiratory distress  Skin:     Coloration: Skin is not pale  Findings: No erythema     Neurological: Mental Status: She is alert and oriented to person, place, and time     Psychiatric:         Mood and Affect: Mood normal          Behavior: Behavior normal           I spent 15 minutes directly with the patient during this visit